# Patient Record
Sex: MALE | Employment: FULL TIME | URBAN - METROPOLITAN AREA
[De-identification: names, ages, dates, MRNs, and addresses within clinical notes are randomized per-mention and may not be internally consistent; named-entity substitution may affect disease eponyms.]

---

## 2022-04-26 ENCOUNTER — OFFICE VISIT (OUTPATIENT)
Dept: FAMILY MEDICINE | Facility: CLINIC | Age: 32
End: 2022-04-26

## 2022-04-26 VITALS
WEIGHT: 221 LBS | TEMPERATURE: 98 F | HEIGHT: 69 IN | HEART RATE: 103 BPM | SYSTOLIC BLOOD PRESSURE: 134 MMHG | RESPIRATION RATE: 18 BRPM | OXYGEN SATURATION: 96 % | BODY MASS INDEX: 32.73 KG/M2 | DIASTOLIC BLOOD PRESSURE: 78 MMHG

## 2022-04-26 DIAGNOSIS — Z02.89 ENCOUNTER FOR EXAMINATION REQUIRED BY DEPARTMENT OF TRANSPORTATION (DOT): Primary | ICD-10-CM

## 2022-04-26 LAB
BILIRUB UR QL: NORMAL
CLARITY: CLEAR
COLOR UR: YELLOW
GLUCOSE URINE: NORMAL MG/DL
HGB UR QL: NORMAL
KETONES UR QL: NORMAL MG/DL
NITRITE UR QL STRIP: NORMAL
PH UR STRIP: 8.5 PH (ref 5–7)
PROT UR QL: NORMAL MG/DL
SP GR UR STRIP: 1.02 (ref 1–1.03)
SPECIMEN VOL UR: NORMAL ML
UROBILINOGEN UR QL STRIP: 1 EU/DL (ref 0.2–1)
WBC #/AREA URNS HPF: NORMAL /[HPF]

## 2022-04-26 PROCEDURE — 99499 UNLISTED E&M SERVICE: CPT | Performed by: FAMILY MEDICINE

## 2022-04-26 PROCEDURE — 81003 URINALYSIS AUTO W/O SCOPE: CPT | Performed by: FAMILY MEDICINE

## 2022-04-26 NOTE — PROGRESS NOTES
"Form MCSA-5875 (revised 2015)                                       B No. [id number]  Expiration Date: 2019    MEDICAL EXAMINATION REPORT FORM  (FOR  MEDICAL CERTIFICATION)    SECTION 1.  Information (to be filled out by )    PERSONAL INFORMATION    Last Name: Keny  First Name: Essie Lam Initial: E  : 1990      Age: 31        Street Address: [address] [place] [address]   City: [place]   State/Province: [place]   Zip Code: [address]  's License Number: [id number]      Issuing State/Province: [place]       Phone: [phone]    Gender: male  E-mail (optional): [email or web address]  CLP/CDL Applicant Molina*: {yes no:[id number]}   ID Verified by**:  Safia Mcdaniel MA  Has your USDOT/FMCSA medical certificate ever been denied or issued for less than 2 years?  {.:412146::\"NO\"}     (*CLP/CDL Applicant/Molina: See instructions for definitions)  (** ID verified By:Record what type of photo ID was used to verify the identity of the , e.g., CDL,'s license, passport)       HEALTH HISTORY    Have you ever had surgery? If \"yes\", please list and explain below.   NO         Are you currently taking medications (prescription, over-the-counter, herbal remedies, diet supplements)? If \"yes,\" please describe below.     NO          HEALTH HISTORY (continued)          Do you have or have you ever had:                                                     Not  Yes    No     Sure                                                                          Not    Yes     No   Sure    1. Head/brain injuries or illness (e.g., concussion)  {.:573764::\"NO\"} 16. Dizziness, headaches, numbness, tingling, or memory loss   {.:730948::\"NO\"}   2. Seizures, epilepsy  {.:249738::\"NO\"} 17. Unexplained weight loss   {.:558831::\"NO\"}   3. Eye problems (except glasses or contacts)  {.:880484::\"NO\"} 18. Stroke, mini stroke (TIA), paralysis, or weakness   " "{.:072185::\"NO\"}   4. Ear and/or hearing problems   {.:579204::\"NO\"} 19. Missing or limited use of arm, hand, finger, leg, foot, toe   {.:236433::\"NO\"}   5. Heart disease, heart attack, bypass, or other heart problems   {.:701875::\"NO\"} 20. Neck or back problems   {.:038347::\"NO\"}   6. Pacemaker, stents, implantable devices, or other heart procedures   {.:801684::\"NO\"} 21. Bone, muscle, joint or nerve problems   {.:896400::\"NO\"}   7. High blood preassure   {.:435468::\"NO\"} 22. Blood clots or bleeding problems   {.:082156::\"NO\"}   8. High cholesterol   {.:928157::\"NO\"} 23. Cancer   {.:029270::\"NO\"}   9. Chronic (long term) cough, shortness of breath, or other breathing problems   {.:250234::\"NO\"} 24. Chronic (long term) infection or other chronic disease   {.:359795::\"NO\"}   10. Lung disease (e.g., asthma)   {.:622854::\"NO\"} 25. Sleep disorders, pauses in breathing while asleep, daytime sleepiness, loud snoring   {.:560990::\"NO\"}   11. Kidney problems, kidney stones, or pain/problems with urination   {.:116334::\"NO\"} 26. Have you ever had a sleep test? (e.g., sleep apnea)   {.:347796::\"NO\"}   12. Stomach, liver, or digestive problems   {.:311792::\"NO\"} 27. Have you ever spent the night in the hospital?   {.:398796::\"NO\"}   13. Diabetes or blood sugar problems   {.:887642::\"NO\"} 28. Have you ever had a broken bone?   {.:568599::\"NO\"}         Insulin used   {.:907758::\"NO\"} 29. Have you ever used or do you now use tobacco?   {.:777951::\"NO\"}   14. Anxiety, depression, nervousness, other mental health problems  {.:505544::\"NO\"} 30. Do you currently drink alcohol?    {.:542574::\"NO\"}   15. Fainting or passing out   {.:566578::\"NO\"} 31.  Have you used an illegal substance within the past two years?    {.:545287::\"NO\"}     32. Have you ever failed a drug test or been dependent on an illegal substance?    {.:420075::\"NO\"}     Other health condition(s) not described above:   {.:453673::\"NO\"}    ***     Did you answer \"yes\" " "to any of questions 1-32?  If so, please comment further on those health conditions below:   {.:508459::\"NO\"}    ***            'S SIGNATURE  I certify that the above information is accurate and complete. I understand that inaccurate, false or missing information may invalidate the examination and my Medical Examiner's Certificate, that submission of fraudulent or intentionally false information is a violation of 49CFR 390.35, and that submission of fraudulent or intentionally false information may subject me to civil or ciminal penalties under 49 .37 and 49  Appendices A and B.     's signature:____________________________        Date: 2022                                         Signature if printed       Section 2. Examination Report (to be filled out by the medical examiner)      HEALTH HISTORY REVIEW  Review and discuss pertinent  answers and any available medical records. Comment on the 's responses to the \"health history\" questions that may affect the 's safe operation of a commercial motor vehicle (CMV).        ***            TESTING     Pulse rate: ***     Pulse rhythm regular: {YES/NO (DEFAULT IS YES):836375::\"YES\"}  Height: *** feet *** inches  Weight: *** pounds    Blood Pressure  Blood Pressure: *** Systolic  *** Diastolic  Sitting ***  Second Reading (optional) ***  Other Testing if indicated    ***       Urinalysis  Urinalysis is required. Numerical readings must be recorded.  Urine Specimen Specific Gravity Protein Blood Sugar    {S} {. :230190::\"NEGATIVE\"} {. :595954::\"NEGATIVE\"} {. :392290::\"NEGATIVE\"}   Protein, blood or sugar in the urine may be an indication for further testing to rule out any underlying medical problem.    Vision  Standard is at least 20/40 acuity (Snellen) in each eye with or without correction. At least 70  field of vision in horizontal meridian measured in each eye. The use of corrective lenses should " "be noted on the Medical Examiner's Certificate.    ACUITY UNCORRECTED CORRECTED HORIZONTAL FIELD OF VISION   Right Eye {.:471189::\"20/20\"} { .:122280::\"20/20\"} { .:654432::\"Greater than 70 degrees\"}   Left Eye { .:707541::\"20/20\"} { .:268788::\"20/20\"} { .:524773::\"Greater than 70 degrees\"}   Both Eyes { .:797352::\"20/20\"} {. :664815::\"20/20\"}      Applicant can recognize and distinguish among traffic control signals and devices showing red, green and kevin colors? {.:4444::\"Yes\"}    Monocular vision: {.:267347::\"No\"}    Referred to ophthalmologist or optometrist?  {.:562359::\"No\"}    Received documentation from ophthalmologist or optometrist?  {.:176032::\"No\"}    HEARING   Standard: Must first perceive forced whispered voice at not less than 5 feet OR average hearing loss of less than or equal to 40 dB, in better ear (with or without hearing aid).    Check if hearing aid used for test:  {.:593906}    {Indicate the type of hearing test performed:414759}         PHYSICAL EXAMINATION  The presence of a certain condition may not necessarily disqualify a , particularly if the condition is controlled adequately, is not likely to worsen, or is readily amenable to treatment. Even if a condition does not disqualify a , the Medical Examiner may consider deferring the  temporarily. Also, the  should be advised to take the necessary steps to correct the condition as soon as possible, particularly if neglecting the condition, could result in more serious illness that might affect driving.    Check the body systems for abnormalities.  BODY SYSTEM Normal or Abnormal   1. General  {.:749353::\"Normal\"}   2. Skin {.:678841::\"Normal\"}   3. Eyes {.:182448::\"Normal\"}   4. Ears {.:167911::\"Normal\"}   5. Mouth/throat {.:637721::\"Normal\"}   6. Cardiovascular {.:428576::\"Normal\"}   7. Lungs/chest {.:506546::\"Normal\"}   8. Abdomen {.:591302::\"Normal\"}   9. Genito-urinary System including hernias {.:944008::\"Normal\"} " "  10. Back/Spine {.:901702::\"Normal\"}   11. Extremities/joints {.:157933::\"Normal\"}   12. Neurological system including reflexes {.:446977::\"Normal\"}   13. Gait {.:430012::\"Normal\"}   14. Vascular system {.:173928::\"Normal\"}     Discuss any abnormal answers in detail in the space below and indicate whether it would affect the 's ability to operate a CMV. Enter applicable item number before each comment.     ***            Please complete only one of the following (Federal or State) Medical Examiner Determination sections:  {DOT FED STATE:813536}         Date submitted to registry: {Format gianna/val/yyyy:564221::\"***\"}  Submitted by: {DOT SUBMITTED BY:754190::\"***\"}     "

## 2022-04-26 NOTE — PROGRESS NOTES
Form MCSA-5875                                  [facility] No. [id number]  Expiration Date: 3/31/2025  MEDICAL EXAMINATION REPORT FORM  (FOR  MEDICAL CERTIFICATION)    SECTION 1.  Information (to be filled out by )    PERSONAL INFORMATION    Last Name: Talat  First Name: Essie Lam Initial: TRAVIS  : 1990      Age: 31        Street Address: [address] [place] [address]   City: [place]   State/Province: [place]   Zip Code: [address]  's License Number: [id number]      Issuing State/Province: [place]       Phone: [phone]    Gender: male  E-mail (optional): [email or web address]  CLP/CDL Applicant Molina*: no   ID Verified by**:  Safia Mcdaniel MA  Has your USDOT/FMCSA medical certificate ever been denied or issued for less than 2 years?  NO       TESTING       Blood Pressure  Blood Pressure: 134/78 Systolic   Diastolic  Sitting yes  Second Reading (optional) NA  Other Testing if indicated    *       Urinalysis  Urinalysis is required. Numerical readings must be recorded.  Urine Specimen Specific Gravity Protein Blood Sugar    1.020 NEGATIVE NEGATIVE NEGATIVE   Protein, blood or sugar in the urine may be an indication for further testing to rule out any underlying medical problem.           PHYSICAL EXAMINATION  The presence of a certain condition may not necessarily disqualify a , particularly if the condition is controlled adequately, is not likely to worsen, or is readily amenable to treatment. Even if a condition does not disqualify a , the Medical Examiner may consider deferring the  temporarily. Also, the  should be advised to take the necessary steps to correct the condition as soon as possible, particularly if neglecting the condition, could result in more serious illness that might affect driving.      Discuss any abnormal answers in detail in the space below and indicate whether it would affect the 's ability to operate a CMV. Enter  applicable item number before each comment.    SEE SCANNED FORMS            Please complete only one of the following (Federal or State) Medical Examiner Determination sections:      MEDICAL EXAMINER DETERMINATION (Federal)  Use this section for examinations performed in accordance with the Federal Motor Carrier Safety Regulations (49 ..49):    [  ] Does not meet standards (specify reason) ________________________________________________________________________________  [ X ] Meets standards in 49 .41; qualifies for 2-year certificate  [  ] Meets standards, but periodic monitoring required (specify reason) ___________________________________________________________         qualified for:   [  ] 3 months               [  ] 6 months               [  ] 1 year            [  ] other (specify): ________________________________  [  ]  Wearing corrective lenses        [  ] Wearing hearing aid        [  ] Accompanied by a waiver/exception(specify type): ____________________  [  ] Accompanied by a Skill Performance Evaluation (SPE) Certificate    [  ]  Qualified by operation of 49 .64 (Federal)  [  ] Driving within an exempt intracity zone (see 49 .62) (Federal)  [  ] Determination pending (specify reason) __________________________________________________________________________________        [  ] Return to medical exam office for follow-up on (must be 45 days or less _______________________________        [  ] Medical Examination Report amended (specify reason) ______________________________________________                    (if amended) Medical Examiner's Signature _____________________________________________________ Date: _______________  [  ] Incomplete examination (specify reason): _________________________________________________________________________________            If the  meets the standards outlined in 49 .41, then complete a Medical Examiner's  Certificate as stated in 49 .43(h), as appropriate.     I have performed this evaluation for certification. I have personally reviewed all available records and recorded information pertaining to this evaluation, and attest that to the best of my knowledge, I believe it to be true and correct.    Medical Examiner's Signature: _________________________________________                                                                                   (if printed)  Medical Examiner's Name: Eric Hogan MD  Medical Examiner's Address:   [address] [address] [phone]  Dept: [phone]    Date Certificate Signed: 4/26/2022    Medical Examiner's State License, Certificate, or Registration Number: [id number]    Issuing State:  [place]    [ X ] M.D.   [  ] D.O.   [  ] Physician Assistant   [  ] Chiropractor   [  ] Advanced Practice Nurse  [  ] Other Practitioner (specify) __________________________________________________    National Registry Number:  [id number]                Medical Examiner's Certificate Expiration Date: 4/26/2024                 If the  meets the standards outlined in 49 .41, with applicable State variances, then complete a Medical Examiner's Certificate, as appropriate.     I have performed this evaluation for certification. I have personally reviewed all available records and recorded information pertaining to this evaluation, and attest that to the best of my knowledge, I believe it to be true and correct.    Medical Examiner's Signature: _________________________________________                                                                                   (if printed)          Date submitted to registry: 4/27/2022  Submitted by: Della Clements/ABRAHAM

## 2023-01-03 ENCOUNTER — OFFICE VISIT (OUTPATIENT)
Dept: URGENT CARE | Facility: URGENT CARE | Age: 33
End: 2023-01-03
Payer: COMMERCIAL

## 2023-01-03 VITALS
HEART RATE: 90 BPM | TEMPERATURE: 98.5 F | RESPIRATION RATE: 14 BRPM | WEIGHT: 221 LBS | OXYGEN SATURATION: 98 % | SYSTOLIC BLOOD PRESSURE: 126 MMHG | BODY MASS INDEX: 32.64 KG/M2 | DIASTOLIC BLOOD PRESSURE: 80 MMHG

## 2023-01-03 DIAGNOSIS — K14.8 LESION OF TONGUE: Primary | ICD-10-CM

## 2023-01-03 DIAGNOSIS — J06.9 VIRAL URI: ICD-10-CM

## 2023-01-03 DIAGNOSIS — J98.01 BRONCHOSPASM, ACUTE: ICD-10-CM

## 2023-01-03 PROCEDURE — 99213 OFFICE O/P EST LOW 20 MIN: CPT | Performed by: PHYSICIAN ASSISTANT

## 2023-01-03 RX ORDER — ALBUTEROL SULFATE 90 UG/1
1-2 AEROSOL, METERED RESPIRATORY (INHALATION) EVERY 4 HOURS PRN
Qty: 6.7 G | Refills: 0 | Status: SHIPPED | OUTPATIENT
Start: 2023-01-03 | End: 2023-02-02

## 2023-01-03 RX ORDER — BENZONATATE 100 MG/1
100-200 CAPSULE ORAL 3 TIMES DAILY PRN
Qty: 30 CAPSULE | Refills: 0 | Status: SHIPPED | OUTPATIENT
Start: 2023-01-03

## 2023-01-03 NOTE — PROGRESS NOTES
Assessment/Plan:    Lungs CTAB, afberile, suspect viral URI. Mucinex, rest, fluids advised. No wheezing currently but will Rx albuterol inhaler at patient's request, as well as benzonatate.    Lesions on tongue of unclear etiology, advised f/u with ENT if persist. Referral placed.     See patient instructions below.    At the end of the encounter, I discussed results, diagnosis, medications. Discussed red flags for immediate return to clinic/ER, as well as indications for follow up if no improvement. Patient understood and agreed to plan. Patient was stable for discharge.      ICD-10-CM    1. Lesion of tongue  K14.8 Adult ENT  Referral      2. Viral URI  J06.9 benzonatate (TESSALON) 100 MG capsule      3. Bronchospasm, acute  J98.01 albuterol (PROAIR HFA/PROVENTIL HFA/VENTOLIN HFA) 108 (90 Base) MCG/ACT inhaler            Return in about 1 week (around 1/10/2023) for Follow up w/ primary care provider if not better.    ROCIO Hernandez, PABrianSandstone Critical [facility] Hospital  -----------------------------------------------------------------------------------------------------------------------------------------------------    HPI:  Essie Gilliland is a 32 year old male who presents for evaluation of sore throat, cough & nasal congestion onset 1 week ago. Sore throat has now resolved. He is requesting an albuterol inhaler refill as he has a hx of wheezing when he has URI symptoms, although he has not had any with this illness. He does not have a diagnosis of asthma. No treatments tried. Patient reports no fever/chills, headache, chest pain, shortness of breath, abdominal pain, nausea, vomiting, diarrhea, rash, or any other symptoms.     He also notes that he noticed several white raised areas on the back of his tongue 4 days ago which is not painful but does feel irritated, especially toward the end of the day. No dysphagia or sores on cheeks/lips.    History reviewed. No pertinent past  medical history.    Vitals:    01/03/23 1013   BP: 126/80   BP Location: Right arm   Patient Position: Chair   Cuff Size: Adult Regular   Pulse: 90   Resp: 14   Temp: 98.5  F (36.9  C)   TempSrc: Oral   SpO2: 98%   Weight: 100.2 kg (221 lb)       Physical Exam  Vitals and nursing note reviewed.   HENT:      Right Ear: Tympanic membrane normal.      Left Ear: Tympanic membrane normal.      Nose: Congestion present.      Mouth/Throat:      Pharynx: Uvula midline. No posterior oropharyngeal erythema.      Tonsils: No tonsillar exudate.      Comments: 3 white pedunculated lesions to midline of posterior tongue, nontender & not able to be removed with a tongue blade  Cardiovascular:      Rate and Rhythm: Normal rate and regular rhythm.   Pulmonary:      Effort: Pulmonary effort is normal.      Breath sounds: Normal breath sounds.   Neurological:      Mental Status: He is alert.       Labs/Imaging:  No results found for this or any previous visit (from the past 24 hour(s)).  No results found for this or any previous visit (from the past 24 hour(s)).        Patient Instructions     Adult Self-Care for Colds  Colds are caused by viruses. They can t be cured with antibiotics. However, you can relieve symptoms and support your body s efforts to heal itself. No matter which symptoms you have, be sure to drink plenty of fluids (water or clear soup); stop smoking and drinking alcohol; and get plenty of rest.      Understand a fever    Take your temperature several times a day. If your fever is 100.4 F (38.0 C) for more than a day, call your doctor.    Relax, lie down. Go to bed if you want. Just get off your feet and rest. Also, drink plenty of fluids to avoid dehydration.    Take acetaminophen or a nonsteroidal anti-inflammatory agent (NSAID), such as ibuprofen.  Treat a troubled nose kindly    Breathe steam or heated humidified air to open blocked nasal passages.  a hot shower or use a vaporizer. Be careful not to get  burned by the steam.    Saline nasal sprays and Mucinex help open a stuffy nose. Antihistamines can also help, but they can cause side effects such as drowsiness and drying of the eyes, nose, and mouth.  Soothe a sore throat and cough    Gargle every 2 hours with 1/4 teaspoon of salt dissolved in 1/2 cup of warm water. Suck on throat lozenges and cough drops to moisten your throat (those containing menthol work best).    Cough medicines are available but it is unclear how effective they actually are.    Try honey for cough    Take acetaminophen or an NSAID, such as ibuprofen to ease throat pain  Ease digestive problems    Put fluid back into your body. Take frequent sips of clear liquids such as water or broth. Do not drink beverages with a lot of sugar in them, such as juices and sodas. These can make diarrhea worse. Older children and adults can drink sports drinks.    As your appetite returns, you can resume your normal diet. Ask your doctor whether there are any foods you should avoid.  When to seek medical care  When you first notice symptoms, ask your health care provider if antiviral medications are appropriate. Antibiotics should not be taken for colds or flu. Also, call your doctor if you have any of the following symptoms or if you aren t feeling better after 7 days:    Shortness of breath    Pain or pressure in the chest or abdomen    Worsening symptoms, especially after a period of improvement    Fever of 100.4 F  (38.0 C) or higher, or fever that doesn t go down with medication    Sudden dizziness or confusion    Severe or continued vomiting    Signs of dehydration, including extreme thirst, dark urine, infrequent urination, dry mouth    Spotted, red, or very sore throat      0316-3027 [employer]. [address] [place] [address]. All rights reserved. This information is not intended as a substitute for professional medical care. Always follow your healthcare professional's  instructions.

## 2023-01-03 NOTE — PATIENT INSTRUCTIONS
Adult Self-Care for Colds  Colds are caused by viruses. They can t be cured with antibiotics. However, you can relieve symptoms and support your body s efforts to heal itself. No matter which symptoms you have, be sure to drink plenty of fluids (water or clear soup); stop smoking and drinking alcohol; and get plenty of rest.      Understand a fever  Take your temperature several times a day. If your fever is 100.4 F (38.0 C) for more than a day, call your doctor.  Relax, lie down. Go to bed if you want. Just get off your feet and rest. Also, drink plenty of fluids to avoid dehydration.  Take acetaminophen or a nonsteroidal anti-inflammatory agent (NSAID), such as ibuprofen.  Treat a troubled nose kindly  Breathe steam or heated humidified air to open blocked nasal passages.  a hot shower or use a vaporizer. Be careful not to get burned by the steam.  Saline nasal sprays and Mucinex help open a stuffy nose. Antihistamines can also help, but they can cause side effects such as drowsiness and drying of the eyes, nose, and mouth.  Soothe a sore throat and cough  Gargle every 2 hours with 1/4 teaspoon of salt dissolved in 1/2 cup of warm water. Suck on throat lozenges and cough drops to moisten your throat (those containing menthol work best).  Cough medicines are available but it is unclear how effective they actually are.  Try honey for cough  Take acetaminophen or an NSAID, such as ibuprofen to ease throat pain  Ease digestive problems  Put fluid back into your body. Take frequent sips of clear liquids such as water or broth. Do not drink beverages with a lot of sugar in them, such as juices and sodas. These can make diarrhea worse. Older children and adults can drink sports drinks.  As your appetite returns, you can resume your normal diet. Ask your doctor whether there are any foods you should avoid.  When to seek medical care  When you first notice symptoms, ask your health care provider if antiviral  medications are appropriate. Antibiotics should not be taken for colds or flu. Also, call your doctor if you have any of the following symptoms or if you aren t feeling better after 7 days:  Shortness of breath  Pain or pressure in the chest or abdomen  Worsening symptoms, especially after a period of improvement  Fever of 100.4 F  (38.0 C) or higher, or fever that doesn t go down with medication  Sudden dizziness or confusion  Severe or continued vomiting  Signs of dehydration, including extreme thirst, dark urine, infrequent urination, dry mouth  Spotted, red, or very sore throat      0567-4482 [employer]. [address] [place] [address]. All rights reserved. This information is not intended as a substitute for professional medical care. Always follow your healthcare professional's instructions.

## 2023-01-03 NOTE — PROGRESS NOTES
Assessment/Plan:      See patient instructions below.    At the end of the encounter, I discussed results, diagnosis, medications. Discussed red flags for immediate return to clinic/ER, as well as indications for follow up if no improvement. Patient understood and agreed to plan. Patient was stable for discharge.    No diagnosis found.      No follow-ups on file.    ROCIO Hernandez, CHARLIE  [facility]  -----------------------------------------------------------------------------------------------------------------------------------------------------    HPI:  Essie Gilliland is a 32 year old male who presents for evaluation of *** onset ***. No treatments tried. Patient reports no fever/chills, headache, chest pain, shortness of breath, abdominal pain, nausea, vomiting, diarrhea, rash, or any other symptoms.     History reviewed. No pertinent past medical history.    Vitals:    01/03/23 1013   BP: 126/80   BP Location: Right arm   Patient Position: Chair   Cuff Size: Adult Regular   Pulse: 90   Resp: 14   Temp: 98.5  F (36.9  C)   TempSrc: Oral   SpO2: 98%   Weight: 100.2 kg (221 lb)       Physical Exam    Labs/Imaging:  No results found for this or any previous visit (from the past 24 hour(s)).  No results found for this or any previous visit (from the past 24 hour(s)).    {Diagnostic Test Results (Optional):441107}    There are no Patient Instructions on file for this visit.

## 2023-01-05 ENCOUNTER — OFFICE VISIT (OUTPATIENT)
Dept: FAMILY MEDICINE | Facility: CLINIC | Age: 33
End: 2023-01-05
Payer: COMMERCIAL

## 2023-01-05 VITALS
WEIGHT: 227.7 LBS | HEART RATE: 82 BPM | SYSTOLIC BLOOD PRESSURE: 136 MMHG | OXYGEN SATURATION: 97 % | DIASTOLIC BLOOD PRESSURE: 88 MMHG | HEIGHT: 69 IN | TEMPERATURE: 98.1 F | RESPIRATION RATE: 16 BRPM | BODY MASS INDEX: 33.72 KG/M2

## 2023-01-05 DIAGNOSIS — R53.83 OTHER FATIGUE: ICD-10-CM

## 2023-01-05 DIAGNOSIS — K14.8 TONGUE LESION: Primary | ICD-10-CM

## 2023-01-05 LAB
ALBUMIN SERPL BCG-MCNC: 4.5 G/DL (ref 3.5–5.2)
ALP SERPL-CCNC: 61 U/L (ref 40–129)
ALT SERPL W P-5'-P-CCNC: 87 U/L (ref 10–50)
ANION GAP SERPL CALCULATED.3IONS-SCNC: 13 MMOL/L (ref 7–15)
AST SERPL W P-5'-P-CCNC: 36 U/L (ref 10–50)
BASOPHILS # BLD AUTO: 0 10E3/UL (ref 0–0.2)
BASOPHILS NFR BLD AUTO: 0 %
BILIRUB SERPL-MCNC: 0.4 MG/DL
BUN SERPL-MCNC: 11.9 MG/DL (ref 6–20)
CALCIUM SERPL-MCNC: 9.4 MG/DL (ref 8.6–10)
CHLORIDE SERPL-SCNC: 102 MMOL/L (ref 98–107)
CREAT SERPL-MCNC: 0.63 MG/DL (ref 0.67–1.17)
DEPRECATED HCO3 PLAS-SCNC: 24 MMOL/L (ref 22–29)
EOSINOPHIL # BLD AUTO: 0.1 10E3/UL (ref 0–0.7)
EOSINOPHIL NFR BLD AUTO: 2 %
ERYTHROCYTE [DISTWIDTH] IN BLOOD BY AUTOMATED COUNT: 12.7 % (ref 10–15)
GFR SERPL CREATININE-BSD FRML MDRD: >90 ML/MIN/1.73M2
GLUCOSE SERPL-MCNC: 89 MG/DL (ref 70–99)
HCT VFR BLD AUTO: 46.2 % (ref 40–53)
HGB BLD-MCNC: 15.9 G/DL (ref 13.3–17.7)
KOH PREPARATION: NORMAL
KOH PREPARATION: NORMAL
LYMPHOCYTES # BLD AUTO: 2.8 10E3/UL (ref 0.8–5.3)
LYMPHOCYTES NFR BLD AUTO: 34 %
MCH RBC QN AUTO: 30.6 PG (ref 26.5–33)
MCHC RBC AUTO-ENTMCNC: 34.4 G/DL (ref 31.5–36.5)
MCV RBC AUTO: 89 FL (ref 78–100)
MONOCYTES # BLD AUTO: 0.8 10E3/UL (ref 0–1.3)
MONOCYTES NFR BLD AUTO: 10 %
NEUTROPHILS # BLD AUTO: 4.6 10E3/UL (ref 1.6–8.3)
NEUTROPHILS NFR BLD AUTO: 55 %
PLATELET # BLD AUTO: 259 10E3/UL (ref 150–450)
POTASSIUM SERPL-SCNC: 4.3 MMOL/L (ref 3.4–5.3)
PROT SERPL-MCNC: 7.2 G/DL (ref 6.4–8.3)
RBC # BLD AUTO: 5.19 10E6/UL (ref 4.4–5.9)
SODIUM SERPL-SCNC: 139 MMOL/L (ref 136–145)
TSH SERPL DL<=0.005 MIU/L-ACNC: 1.76 UIU/ML (ref 0.3–4.2)
WBC # BLD AUTO: 8.3 10E3/UL (ref 4–11)

## 2023-01-05 PROCEDURE — 36415 COLL VENOUS BLD VENIPUNCTURE: CPT | Performed by: NURSE PRACTITIONER

## 2023-01-05 PROCEDURE — 87070 CULTURE OTHR SPECIMN AEROBIC: CPT | Performed by: NURSE PRACTITIONER

## 2023-01-05 PROCEDURE — 80050 GENERAL HEALTH PANEL: CPT | Performed by: NURSE PRACTITIONER

## 2023-01-05 PROCEDURE — 87220 TISSUE EXAM FOR FUNGI: CPT | Performed by: NURSE PRACTITIONER

## 2023-01-05 PROCEDURE — 99214 OFFICE O/P EST MOD 30 MIN: CPT | Performed by: NURSE PRACTITIONER

## 2023-01-05 ASSESSMENT — PAIN SCALES - GENERAL: PAINLEVEL: NO PAIN (0)

## 2023-01-05 NOTE — PROGRESS NOTES
Assessment & Plan     Tongue lesion: unlikely to be fungus since not able to be removed but will be checked, will also check for bacterial culture.  Has an appt set up with ENT on 1/10, encouraged to go to that appointment for further evaluation.  - KOH prep (Other than skin, nails, hair)  - Swab Aerobic Bacterial Culture Routine    Other fatigue:  Will obtain basic labs for evaluation.  Also discussed following healthy diet, exercise.    - CBC with Platelets & Differential  - Comprehensive metabolic panel  - TSH with free T4 reflex      Follow up with ENT on 1/10/2023 for tongue lesion.    Susan Haase, APRN CNP  [facility] is a 32 year old male presenting with his wife and daughter, presenting for the following health issues:  Urgent Care (Follow Up)    HPI     Pt wanting some labs drawn after appointment.    ED/UC Followup:    Facility:  [facility]  Date of visit: 1/3/2023  Reason for visit: Lesion of tongue, viral URI  Current Status: Pt states that his cold symptoms have improved.  He continues to have white lesions on his tongue.    Tongue lesions:  Noticed 7 days ago after eating fish when on vacation in [place], appeared suddenly, white and raised along the back of the tongue, painful.  Denies lesions in other areas of the mouth, has not had any new lesions.  Has not tried any mouth washes or treatments.  The pain has gradually improved and are slightly smaller in size.     Fatigue:  Is also concerned about ongoing fatigue for the last 2-3 months, is interested in having some blood work completed to check for this.  Denies fever, body aches, weight loss, weakness.      Review of Systems   CONSTITUTIONAL: NEGATIVE for fever, chills, change in weight  ENT/MOUTH: NEGATIVE for ear, mouth and throat problems  RESP: NEGATIVE for significant cough or SOB  CV: NEGATIVE for chest pain, palpitations or peripheral edema  NEURO: NEGATIVE for  "weakness, dizziness or paresthesias      Objective    /88   Pulse 82   Temp 98.1  F (36.7  C) (Oral)   Resp 16   Ht 1.753 m (5' 9\")   Wt 103.3 kg (227 lb 11.2 oz)   SpO2 97%   BMI 33.63 kg/m    Body mass index is 33.63 kg/m .  Physical Exam   GENERAL: healthy, alert and no distress  HENT: ear canals and TM's normal, nose normal.    Tongue with 4 white pedunculated lesions along the midline of the posterior tongue, unable to remove with tongue blade. Remainder of tongue and mouth without lesions.       NECK: no adenopathy, no asymmetry, masses, or scars and thyroid normal to palpation  RESP: lungs clear to auscultation - no rales, rhonchi or wheezes  CV: regular rate and rhythm, normal S1 S2,   SKIN: no suspicious lesions or rashes  NEURO: mentation intact and speech normal    Results for orders placed or performed in visit on 01/05/23 (from the past 24 hour(s))   CBC with Platelets & Differential    Narrative    The following orders were created for panel order CBC with Platelets & Differential.  Procedure                               Abnormality         Status                     ---------                               -----------         ------                     CBC with platelets and d...[[id number]]                      Final result                 Please view results for these tests on the individual orders.   Comprehensive metabolic panel   Result Value Ref Range    Sodium 139 136 - 145 mmol/L    Potassium 4.3 3.4 - 5.3 mmol/L    Chloride 102 98 - 107 mmol/L    Carbon Dioxide (CO2) 24 22 - 29 mmol/L    Anion Gap 13 7 - 15 mmol/L    Urea Nitrogen 11.9 6.0 - 20.0 mg/dL    Creatinine 0.63 (L) 0.67 - 1.17 mg/dL    Calcium 9.4 8.6 - 10.0 mg/dL    Glucose 89 70 - 99 mg/dL    Alkaline Phosphatase 61 40 - 129 U/L    AST 36 10 - 50 U/L    ALT 87 (H) 10 - 50 U/L    Protein Total 7.2 6.4 - 8.3 g/dL    Albumin 4.5 3.5 - 5.2 g/dL    Bilirubin Total 0.4 <=1.2 mg/dL    GFR Estimate >90 >60 mL/min/1.73m2   TSH " with free T4 reflex   Result Value Ref Range    TSH 1.76 0.30 - 4.20 uIU/mL   CBC with platelets and differential   Result Value Ref Range    WBC Count 8.3 4.0 - 11.0 10e3/uL    RBC Count 5.19 4.40 - 5.90 10e6/uL    Hemoglobin 15.9 13.3 - 17.7 g/dL    Hematocrit 46.2 40.0 - 53.0 %    MCV 89 78 - 100 fL    MCH 30.6 26.5 - 33.0 pg    MCHC 34.4 31.5 - 36.5 g/dL    RDW 12.7 10.0 - 15.0 %    Platelet Count 259 150 - 450 10e3/uL    % Neutrophils 55 %    % Lymphocytes 34 %    % Monocytes 10 %    % Eosinophils 2 %    % Basophils 0 %    Absolute Neutrophils 4.6 1.6 - 8.3 10e3/uL    Absolute Lymphocytes 2.8 0.8 - 5.3 10e3/uL    Absolute Monocytes 0.8 0.0 - 1.3 10e3/uL    Absolute Eosinophils 0.1 0.0 - 0.7 10e3/uL    Absolute Basophils 0.0 0.0 - 0.2 10e3/uL   KOH prep (Other than skin, nails, hair)    Specimen: Tongue; Skin   Result Value Ref Range    KOH Preparation No fungal elements seen     KOH Preparation Reference Range: No fungal elements seen.

## 2023-01-07 LAB — BACTERIA SPEC CULT: NORMAL

## 2023-01-10 ENCOUNTER — OFFICE VISIT (OUTPATIENT)
Dept: OTOLARYNGOLOGY | Facility: CLINIC | Age: 33
End: 2023-01-10
Payer: COMMERCIAL

## 2023-01-10 DIAGNOSIS — K14.0 GLOSSITIS: Primary | ICD-10-CM

## 2023-01-10 DIAGNOSIS — R06.83 SNORING: ICD-10-CM

## 2023-01-10 PROCEDURE — 99243 OFF/OP CNSLTJ NEW/EST LOW 30: CPT | Performed by: OTOLARYNGOLOGY

## 2023-01-10 RX ORDER — NYSTATIN 100000/ML
500000 SUSPENSION, ORAL (FINAL DOSE FORM) ORAL 4 TIMES DAILY
Qty: 280 ML | Refills: 0 | Status: SHIPPED | OUTPATIENT
Start: 2023-01-10 | End: 2023-01-24

## 2023-01-10 NOTE — LETTER
1/10/2023         RE: Essie Gilliland  [address]  [facility] [address]        Dear Colleague,    Thank you for referring your patient, Essie Gilliland, to the [facility]. Please see a copy of my visit note below.    HPI: This patient is a 31yo M who presents to clinic for evaluation of a tongue lesion at the request of Alexus Guzman PA-C. Since about [place], he has had some bumps on his tongue that flare up and go back down. He had a sore throat with it to start, but not really so much anymore. Denies fevers, unintentional weight loss, odynophagia, dysphagia, hemoptysis, voice changes, and shortness of breath. He also has quite a bit of snoring, but is unsure if there is apnea; his wife has not commented on that.     Past medical history, surgical history, social history, family history, medications, and allergies have been reviewed with the patient and are documented above.    Review of Systems: a 10-system review was performed. Pertinent positives are noted in the HPI and on a separate scanned document in the chart.    PHYSICAL EXAMINATION:  GEN: no acute distress, normocephalic  EYES: extraocular movements are intact, pupils are equal and round. Sclera clear.   EARS: auricles are normally formed. The external auditory canals are clear with minimal to no cerumen. Tympanic membranes are intact bilaterally with no signs of infection, effusion, retractions, or perforations.  NOSE: anterior nares are patent.   OC/OP: clear, dentition is in good repair. The tongue and palate are fully mobile and symmetric. Clark 4, tonsils 1.5+ symmetric, palate minimally elongated. The posterior dorsal surface of the tongue is notable for a few inflamed taste buds with a little thickening of the tissue in the midline. No ulceration, no pain.   NECK: soft and supple, thick. No lymphadenopathy or masses. Airway is midline.  NEURO: CN VII and XII symmetric. alert and oriented. No  spontaneous nystagmus. Gait is normal.  PULM: breathing comfortably on room air, normal chest expansion with respiration  CARDS: no cyanosis or clubbing, normal carotid pulses    MEDICAL DECISION-MAKING: This patient is a 31yo M with glossitis. Will Rx mouth rinse and reassured him that this will self-resolve. As far as the snoring is concerned, will send for a PSG t determine snoring vs giacomo.        Again, thank you for allowing me to participate in the care of your patient.        Sincerely,        Nelly Murdock MD

## 2023-01-10 NOTE — PROGRESS NOTES
HPI: This patient is a 31yo M who presents to clinic for evaluation of a tongue lesion at the request of Alexus Guzman PA-C. Since about [place], he has had some bumps on his tongue that flare up and go back down. He had a sore throat with it to start, but not really so much anymore. Denies fevers, unintentional weight loss, odynophagia, dysphagia, hemoptysis, voice changes, and shortness of breath. He also has quite a bit of snoring, but is unsure if there is apnea; his wife has not commented on that.     Past medical history, surgical history, social history, family history, medications, and allergies have been reviewed with the patient and are documented above.    Review of Systems: a 10-system review was performed. Pertinent positives are noted in the HPI and on a separate scanned document in the chart.    PHYSICAL EXAMINATION:  GEN: no acute distress, normocephalic  EYES: extraocular movements are intact, pupils are equal and round. Sclera clear.   EARS: auricles are normally formed. The external auditory canals are clear with minimal to no cerumen. Tympanic membranes are intact bilaterally with no signs of infection, effusion, retractions, or perforations.  NOSE: anterior nares are patent.   OC/OP: clear, dentition is in good repair. The tongue and palate are fully mobile and symmetric. Clark 4, tonsils 1.5+ symmetric, palate minimally elongated. The posterior dorsal surface of the tongue is notable for a few inflamed taste buds with a little thickening of the tissue in the midline. No ulceration, no pain.   NECK: soft and supple, thick. No lymphadenopathy or masses. Airway is midline.  NEURO: CN VII and XII symmetric. alert and oriented. No spontaneous nystagmus. Gait is normal.  PULM: breathing comfortably on room air, normal chest expansion with respiration  CARDS: no cyanosis or clubbing, normal carotid pulses    MEDICAL DECISION-MAKING: This patient is a 31yo M with glossitis. Will Rx mouth rinse and  reassured him that this will self-resolve. As far as the snoring is concerned, will send for a PSG t determine snoring vs giacomo.